# Patient Record
Sex: MALE | Race: WHITE | HISPANIC OR LATINO | Employment: FULL TIME | URBAN - METROPOLITAN AREA
[De-identification: names, ages, dates, MRNs, and addresses within clinical notes are randomized per-mention and may not be internally consistent; named-entity substitution may affect disease eponyms.]

---

## 2022-10-14 ENCOUNTER — APPOINTMENT (OUTPATIENT)
Dept: RADIOLOGY | Facility: MEDICAL CENTER | Age: 47
End: 2022-10-14
Attending: EMERGENCY MEDICINE

## 2022-10-14 ENCOUNTER — HOSPITAL ENCOUNTER (EMERGENCY)
Facility: MEDICAL CENTER | Age: 47
End: 2022-10-14
Attending: EMERGENCY MEDICINE

## 2022-10-14 VITALS
BODY MASS INDEX: 46.75 KG/M2 | SYSTOLIC BLOOD PRESSURE: 133 MMHG | HEART RATE: 81 BPM | WEIGHT: 238.1 LBS | DIASTOLIC BLOOD PRESSURE: 92 MMHG | RESPIRATION RATE: 18 BRPM | TEMPERATURE: 97.6 F | HEIGHT: 60 IN | OXYGEN SATURATION: 93 %

## 2022-10-14 DIAGNOSIS — R07.9 CHEST PAIN, UNSPECIFIED TYPE: ICD-10-CM

## 2022-10-14 DIAGNOSIS — R06.02 SHORTNESS OF BREATH: Primary | ICD-10-CM

## 2022-10-14 LAB
ALBUMIN SERPL BCP-MCNC: 4.7 G/DL (ref 3.2–4.9)
ALBUMIN/GLOB SERPL: 1.4 G/DL
ALP SERPL-CCNC: 77 U/L (ref 30–99)
ALT SERPL-CCNC: 13 U/L (ref 2–50)
ANION GAP SERPL CALC-SCNC: 12 MMOL/L (ref 7–16)
AST SERPL-CCNC: 15 U/L (ref 12–45)
BASOPHILS # BLD AUTO: 0.6 % (ref 0–1.8)
BASOPHILS # BLD: 0.05 K/UL (ref 0–0.12)
BILIRUB SERPL-MCNC: 0.6 MG/DL (ref 0.1–1.5)
BUN SERPL-MCNC: 13 MG/DL (ref 8–22)
CALCIUM SERPL-MCNC: 9.4 MG/DL (ref 8.5–10.5)
CHLORIDE SERPL-SCNC: 102 MMOL/L (ref 96–112)
CO2 SERPL-SCNC: 23 MMOL/L (ref 20–33)
CREAT SERPL-MCNC: 1 MG/DL (ref 0.5–1.4)
D DIMER PPP IA.FEU-MCNC: <0.27 UG/ML (FEU) (ref 0–0.5)
EKG IMPRESSION: NORMAL
EOSINOPHIL # BLD AUTO: 0.17 K/UL (ref 0–0.51)
EOSINOPHIL NFR BLD: 2.1 % (ref 0–6.9)
ERYTHROCYTE [DISTWIDTH] IN BLOOD BY AUTOMATED COUNT: 39.8 FL (ref 35.9–50)
FLUAV RNA SPEC QL NAA+PROBE: NEGATIVE
FLUBV RNA SPEC QL NAA+PROBE: NEGATIVE
GFR SERPLBLD CREATININE-BSD FMLA CKD-EPI: 93 ML/MIN/1.73 M 2
GLOBULIN SER CALC-MCNC: 3.3 G/DL (ref 1.9–3.5)
GLUCOSE SERPL-MCNC: 111 MG/DL (ref 65–99)
HCT VFR BLD AUTO: 44.3 % (ref 42–52)
HGB BLD-MCNC: 15.6 G/DL (ref 14–18)
IMM GRANULOCYTES # BLD AUTO: 0.03 K/UL (ref 0–0.11)
IMM GRANULOCYTES NFR BLD AUTO: 0.4 % (ref 0–0.9)
LYMPHOCYTES # BLD AUTO: 1.83 K/UL (ref 1–4.8)
LYMPHOCYTES NFR BLD: 22.9 % (ref 22–41)
MCH RBC QN AUTO: 30.4 PG (ref 27–33)
MCHC RBC AUTO-ENTMCNC: 35.2 G/DL (ref 33.7–35.3)
MCV RBC AUTO: 86.4 FL (ref 81.4–97.8)
MONOCYTES # BLD AUTO: 0.63 K/UL (ref 0–0.85)
MONOCYTES NFR BLD AUTO: 7.9 % (ref 0–13.4)
NEUTROPHILS # BLD AUTO: 5.28 K/UL (ref 1.82–7.42)
NEUTROPHILS NFR BLD: 66.1 % (ref 44–72)
NRBC # BLD AUTO: 0 K/UL
NRBC BLD-RTO: 0 /100 WBC
PLATELET # BLD AUTO: 235 K/UL (ref 164–446)
PMV BLD AUTO: 8.4 FL (ref 9–12.9)
POTASSIUM SERPL-SCNC: 3.9 MMOL/L (ref 3.6–5.5)
PROT SERPL-MCNC: 8 G/DL (ref 6–8.2)
RBC # BLD AUTO: 5.13 M/UL (ref 4.7–6.1)
RSV RNA SPEC QL NAA+PROBE: NEGATIVE
SARS-COV-2 RNA RESP QL NAA+PROBE: NOTDETECTED
SODIUM SERPL-SCNC: 137 MMOL/L (ref 135–145)
SPECIMEN SOURCE: NORMAL
TROPONIN T SERPL-MCNC: <6 NG/L (ref 6–19)
WBC # BLD AUTO: 8 K/UL (ref 4.8–10.8)

## 2022-10-14 PROCEDURE — C9803 HOPD COVID-19 SPEC COLLECT: HCPCS | Performed by: EMERGENCY MEDICINE

## 2022-10-14 PROCEDURE — 93005 ELECTROCARDIOGRAM TRACING: CPT

## 2022-10-14 PROCEDURE — 0241U HCHG SARS-COV-2 COVID-19 NFCT DS RESP RNA 4 TRGT MIC: CPT

## 2022-10-14 PROCEDURE — 93005 ELECTROCARDIOGRAM TRACING: CPT | Performed by: EMERGENCY MEDICINE

## 2022-10-14 PROCEDURE — 84484 ASSAY OF TROPONIN QUANT: CPT

## 2022-10-14 PROCEDURE — 85379 FIBRIN DEGRADATION QUANT: CPT

## 2022-10-14 PROCEDURE — 85025 COMPLETE CBC W/AUTO DIFF WBC: CPT

## 2022-10-14 PROCEDURE — 99284 EMERGENCY DEPT VISIT MOD MDM: CPT

## 2022-10-14 PROCEDURE — 36415 COLL VENOUS BLD VENIPUNCTURE: CPT

## 2022-10-14 PROCEDURE — 71045 X-RAY EXAM CHEST 1 VIEW: CPT

## 2022-10-14 PROCEDURE — 80053 COMPREHEN METABOLIC PANEL: CPT

## 2022-10-14 RX ORDER — LISINOPRIL 10 MG/1
10 TABLET ORAL DAILY
COMMUNITY

## 2022-10-15 NOTE — ED NOTES
Pt discharged home. IV discontinued and gauze placed, pt in possession of belongings. Pt provided discharge education and information pertaining to medications and follow up appointments. Pt received copy of discharge instructions and verbalized understanding. BP (!) 133/92   Pulse 81   Temp 36.4 °C (97.6 °F) (Temporal)   Resp 18   Ht 1.524 m (5')   Wt 108 kg (238 lb 1.6 oz)   SpO2 93%   BMI 46.50 kg/m²     Language line solutions  [place], ID# [id number] used for discharge instructions

## 2022-10-15 NOTE — DISCHARGE INSTRUCTIONS
Follow-up with your primary care physician for further evaluation treatment.  Come back if her symptoms worsen.  Thank you for coming in today.

## 2022-10-15 NOTE — ED NOTES
Chief Complaint   Patient presents with   • Chest Pain     X several months. Describes pain as tightness and intermittent non-radiating. Rates pain as 2/10   • Shortness of Breath     X several months.    • Abnormal Labs     Got a call from [facility] and was told to go to ER today for abnormal labs. He was told they couldn't discussed it over the phone and that he should go to ER.      Was seen in [facility] yesterday for same and got discharged home w/prescription of lisinopril.  Montenegrin speaking only, ipad  used for this pt (Hansel [id number]). Educated on triage process. Instructed to notify staff for any worsening symptoms. Denies any recent travel. Denies exposure to known covid positive patients. Denies any respiratory symptoms.

## 2022-10-15 NOTE — ED PROVIDER NOTES
ED Provider Note    Scribed for Jass Goodman by Gilson Kinney. 10/14/2022  7:52 PM    Primary care provider: No primary care provider noted.  Means of arrival: Walk-In  History obtained from: Patient  History limited by: None    CHIEF COMPLAINT  Chief Complaint   Patient presents with    Chest Pain     X several months. Describes pain as tightness and intermittent non-radiating. Rates pain as 2/10    Shortness of Breath     X several months.     Abnormal Labs     Got a call from [facility] and was told to go to ER today for abnormal labs. He was told they couldn't discussed it over the phone and that he should go to ER.      HPI  Saul Abel is a 47 y.o. male who presents to the Emergency Department for follow-up on chest pain. At [facility] endorses chronic chest pain and shortness of breath that has lasted for 2+ months. He describes the pain as an constant non-radiating tightness and states that there is not exacerbation from exertion. He denies any nausea, vomiting, cough, fever, Covid, or leg swelling. He reports that he can't sleep since he will stop breathing, causing him to wake up. He states that he got a call from [facility] in [place], [place] at 3AM today which he was discharged from at 12AM today after admission. Dr. Isela Cabello (Emergency Medicine) saw him yesterday in [facility]'s Emergency Department. After discharge he was told to go to the ED as they didn't perform an important test. Saul admits to drinking alcohol but denies smoking or doing any drugs.    Quality:Tightness  Duration: 2  months  Severity: Mild  Associated sx: Chest pain, shortness of breath    REVIEW OF SYSTEMS  As above, all other systems reviewed and are negative.   See HPI for further details.     PAST MEDICAL HISTORY   None noted    SURGICAL HISTORY  patient denies any surgical history    SOCIAL HISTORY  Social History     Substance and Sexual Activity   Drug Use None noted     FAMILY  HISTORY  None noted    CURRENT MEDICATIONS  Home Medications       Reviewed by Ruth Davis R.N. (Registered Nurse) on 10/14/22 at 1855  Med List Status: Partial     Medication Last Dose Status        Patient Tim Taking any Medications                         ALLERGIES  No Known Allergies    PHYSICAL EXAM    VITAL SIGNS:   Vitals:    10/14/22 2044 10/14/22 2045 10/14/22 2100 10/14/22 2200   BP: (!) 129/91  127/82 (!) 133/92   Pulse:  87 92 81   Resp:    18   Temp:    36.4 °C (97.6 °F)   TempSrc:    Temporal   SpO2:  96% 95% 93%   Weight:       Height:         Vitals: My interpretation: Hypertensive, tachycardic, afebrile, not hypoxic    Reinterpretation of vitals: Tachycardia resolved, normotensive, afebrile    Cardiac Monitor Interpretation: The cardiac monitor revealed normal Sinus Rhythm as interpreted by me. The cardiac monitor was ordered secondary to the patient's history of chest pain and to monitor for dysrhythmia and/or tachycardia.    PE:   Constitutional: Well developed, Well nourished, No acute distress, Non-toxic appearance.   HENT: Normocephalic, Atraumatic, Bilateral external ears normal, Oropharynx is clear mucous membranes are moist. No oral exudates or nasal discharge.   Eyes: Pupils are equal round and reactive, EOMI, Conjunctiva normal, No discharge.   Neck: Normal range of motion, No tenderness, Supple, No stridor. No meningismus.  Lymphatic: No lymphadenopathy noted.   Cardiovascular: Regular rate and rhythm without murmur rub or gallop.  Thorax & Lungs: Clear breath sounds bilaterally without wheezes, rhonchi or rales. There is no chest wall tenderness.   Abdomen: Soft non-tender non-distended. There is no rebound or guarding. No organomegaly is appreciated. Bowel sounds are normal.  Skin: Normal without rash.   Back: No CVA or spinal tenderness.   Extremities: Intact distal pulses, No edema, No tenderness, No cyanosis, No clubbing. Capillary refill is less than 2  seconds.  Musculoskeletal: Good range of motion in all major joints. No tenderness to palpation or major deformities noted.   Neurologic: Alert & oriented x 3, Normal motor function, Normal sensory function, No focal deficits noted. Reflexes are normal.  Psychiatric: Affect normal, Judgment normal, Mood normal. There is no suicidal ideation or patient reported hallucinations.     DIAGNOSTIC STUDIES / PROCEDURES    LABS  Results for orders placed or performed during the hospital encounter of 10/14/22   CBC with Differential   Result Value Ref Range    WBC 8.0 4.8 - 10.8 K/uL    RBC 5.13 4.70 - 6.10 M/uL    Hemoglobin 15.6 14.0 - 18.0 g/dL    Hematocrit 44.3 42.0 - 52.0 %    MCV 86.4 81.4 - 97.8 fL    MCH 30.4 27.0 - 33.0 pg    MCHC 35.2 33.7 - 35.3 g/dL    RDW 39.8 35.9 - 50.0 fL    Platelet Count 235 164 - 446 K/uL    MPV 8.4 (L) 9.0 - 12.9 fL    Neutrophils-Polys 66.10 44.00 - 72.00 %    Lymphocytes 22.90 22.00 - 41.00 %    Monocytes 7.90 0.00 - 13.40 %    Eosinophils 2.10 0.00 - 6.90 %    Basophils 0.60 0.00 - 1.80 %    Immature Granulocytes 0.40 0.00 - 0.90 %    Nucleated RBC 0.00 /100 WBC    Neutrophils (Absolute) 5.28 1.82 - 7.42 K/uL    Lymphs (Absolute) 1.83 1.00 - 4.80 K/uL    Monos (Absolute) 0.63 0.00 - 0.85 K/uL    Eos (Absolute) 0.17 0.00 - 0.51 K/uL    Baso (Absolute) 0.05 0.00 - 0.12 K/uL    Immature Granulocytes (abs) 0.03 0.00 - 0.11 K/uL    NRBC (Absolute) 0.00 K/uL   Complete Metabolic Panel (CMP)   Result Value Ref Range    Sodium 137 135 - 145 mmol/L    Potassium 3.9 3.6 - 5.5 mmol/L    Chloride 102 96 - 112 mmol/L    Co2 23 20 - 33 mmol/L    Anion Gap 12.0 7.0 - 16.0    Glucose 111 (H) 65 - 99 mg/dL    Bun 13 8 - 22 mg/dL    Creatinine 1.00 0.50 - 1.40 mg/dL    Calcium 9.4 8.5 - 10.5 mg/dL    AST(SGOT) 15 12 - 45 U/L    ALT(SGPT) 13 2 - 50 U/L    Alkaline Phosphatase 77 30 - 99 U/L    Total Bilirubin 0.6 0.1 - 1.5 mg/dL    Albumin 4.7 3.2 - 4.9 g/dL    Total Protein 8.0 6.0 - 8.2 g/dL     Globulin 3.3 1.9 - 3.5 g/dL    A-G Ratio 1.4 g/dL   Troponin   Result Value Ref Range    Troponin T <6 6 - 19 ng/L   ESTIMATED GFR   Result Value Ref Range    GFR (CKD-EPI) 93 >60 mL/min/1.73 m 2   D-DIMER   Result Value Ref Range    D-Dimer Screen <0.27 0.00 - 0.50 ug/mL (FEU)   CoV-2, FLU A/B, and RSV by PCR (2-4 Hours CEPHEID) : Collect NP swab in VTM    Specimen: Respirate   Result Value Ref Range    SARS-CoV-2 Source NP Swab    EKG   Result Value Ref Range    Report       [facility] Emergency Dept.    Test Date:  2022-10-14  Pt Name:    SAUL GATES       Department: ER  MRN:        [id number]                      Room:  Gender:     Male                         Technician: [id number]  :        1975                   Requested By:ER TRIAGE PROTOCOL  Order #:    [id number]                    Reading MD: Jass Goodman    Measurements  Intervals                                Axis  Rate:       91                           P:          51  MS:         170                          QRS:        -21  QRSD:       114                          T:          17  QT:         362  QTc:        446    Interpretive Statements  Sinus rhythm  Incomplete RBBB and LAFB  Borderline ST elevation, lateral leads  Baseline wander in lead(s) V1,V2  No previous ECG available for comparison  Electronically Signed On 10- 19:52:51 PDT by Jass Goodman        All labs reviewed by me. Significant for no leukocytosis, no anemia, normal electrolytes, normal renal function, normal liver enzymes, normal bilirubin, troponin negative, D-dimer negative    RADIOLOGY  DX-CHEST-PORTABLE (1 VIEW)   Final Result         Hazy left basilar opacities, likely atelectasis.        The radiologist's interpretation of all radiological studies have been reviewed by me.    COURSE & MEDICAL DECISION MAKING  Nursing notes, VS, PMSFHx, labs, imaging, EKG reviewed in chart.    Heart Score: Low    MDM: 7:52 PM Saul Sebastian  Colten is a 47 y.o. male who presented with approximately 6 months of some mild chest discomfort that is nonexertional, nonradiating and generalized, as well as associated shortness of breath.  Patient does not follow-up with a PCP and takes no medications.  Was seen yesterday at a hospital in [facility], and had a full work-up including CTA imaging of the chest, chest x-ray, troponin and labs which were all unremarkable for any acute findings he was discharged with lisinopril.  Apparently he was called today by someone there and told to come to the emergency department for abnormal labs, however they did not tell him what was abnormal.  I was able to receive all labs and imaging done at outside facility and on review there is nothing abnormal or concerning.  He had repeat labs done today including CBC, CMP, D-dimer, troponin, chest x-ray and EKG which were all negative and reassuring.  He has a benign physical exam and normal vital signs at time of discharge.  At this time unclear what was abnormal outside facility but according to documentation as far as I can see, everything came back essentially negative for any acute findings and today's work-up is very reassuring.  Patient is ambulatory and well-appearing at time of discharge and verbalized understand strict return precautions outpatient follow-up plan.     FINAL IMPRESSION  1. Shortness of breath Acute   2. Chest pain, unspecified type Acute      Gilson STEWART (Bibi), am scribing for, and in the presence of, Jass Goodman.    Electronically signed by: Gilson Kinney (Bibi), 10/14/2022    IJass personally performed the services described in this documentation, as scribed by Gilson Kinney in my presence, and it is both accurate and complete.    The note accurately reflects work and decisions made by me.  Jass Goodman  10/14/2022  10:18 PM

## 2022-10-15 NOTE — ED NOTES
Pt to [facility] from [facility], pt in gown and on monitor, call light in reach, chart up for ERP.